# Patient Record
Sex: FEMALE
[De-identification: names, ages, dates, MRNs, and addresses within clinical notes are randomized per-mention and may not be internally consistent; named-entity substitution may affect disease eponyms.]

---

## 2018-06-24 ENCOUNTER — HOSPITAL ENCOUNTER (EMERGENCY)
Dept: HOSPITAL 14 - H.ER | Age: 73
Discharge: HOME | End: 2018-06-24
Payer: COMMERCIAL

## 2018-06-24 VITALS — RESPIRATION RATE: 18 BRPM

## 2018-06-24 VITALS
OXYGEN SATURATION: 98 % | TEMPERATURE: 98.3 F | DIASTOLIC BLOOD PRESSURE: 74 MMHG | HEART RATE: 74 BPM | SYSTOLIC BLOOD PRESSURE: 132 MMHG

## 2018-06-24 DIAGNOSIS — R42: Primary | ICD-10-CM

## 2018-06-24 DIAGNOSIS — E78.00: ICD-10-CM

## 2018-06-24 DIAGNOSIS — R10.9: ICD-10-CM

## 2018-06-24 LAB
ALBUMIN SERPL-MCNC: 4.4 G/DL (ref 3.5–5)
ALBUMIN/GLOB SERPL: 1.2 {RATIO} (ref 1–2.1)
ALT SERPL-CCNC: 34 U/L (ref 9–52)
AST SERPL-CCNC: 29 U/L (ref 14–36)
BASOPHILS # BLD AUTO: 0.1 K/UL (ref 0–0.2)
BASOPHILS NFR BLD: 1.5 % (ref 0–2)
BUN SERPL-MCNC: 14 MG/DL (ref 7–17)
CALCIUM SERPL-MCNC: 9.3 MG/DL (ref 8.4–10.2)
EOSINOPHIL # BLD AUTO: 0 K/UL (ref 0–0.7)
EOSINOPHIL NFR BLD: 0.6 % (ref 0–4)
ERYTHROCYTE [DISTWIDTH] IN BLOOD BY AUTOMATED COUNT: 13.7 % (ref 11.5–14.5)
GFR NON-AFRICAN AMERICAN: > 60
HGB BLD-MCNC: 11.2 G/DL (ref 12–16)
LYMPHOCYTES # BLD AUTO: 0.9 K/UL (ref 1–4.3)
LYMPHOCYTES NFR BLD AUTO: 21.9 % (ref 20–40)
MCH RBC QN AUTO: 29.9 PG (ref 27–31)
MCHC RBC AUTO-ENTMCNC: 33.6 G/DL (ref 33–37)
MCV RBC AUTO: 89 FL (ref 81–99)
MONOCYTES # BLD: 0.3 K/UL (ref 0–0.8)
MONOCYTES NFR BLD: 6.3 % (ref 0–10)
NEUTROPHILS # BLD: 2.8 K/UL (ref 1.8–7)
NEUTROPHILS NFR BLD AUTO: 69.7 % (ref 50–75)
NRBC BLD AUTO-RTO: 0.1 % (ref 0–0)
PLATELET # BLD: 204 K/UL (ref 130–400)
PMV BLD AUTO: 9.5 FL (ref 7.2–11.7)
RBC # BLD AUTO: 3.75 MIL/UL (ref 3.8–5.2)
WBC # BLD AUTO: 4 K/UL (ref 4.8–10.8)

## 2018-06-24 PROCEDURE — 74177 CT ABD & PELVIS W/CONTRAST: CPT

## 2018-06-24 PROCEDURE — 80053 COMPREHEN METABOLIC PANEL: CPT

## 2018-06-24 PROCEDURE — 85025 COMPLETE CBC W/AUTO DIFF WBC: CPT

## 2018-06-24 PROCEDURE — 93005 ELECTROCARDIOGRAM TRACING: CPT

## 2018-06-24 PROCEDURE — 84484 ASSAY OF TROPONIN QUANT: CPT

## 2018-06-24 PROCEDURE — 99285 EMERGENCY DEPT VISIT HI MDM: CPT

## 2018-06-24 PROCEDURE — 70450 CT HEAD/BRAIN W/O DYE: CPT

## 2018-06-24 NOTE — CT
PROCEDURE:  CT HEAD WITHOUT CONTRAST.



HISTORY:

headache



COMPARISON:

Noncontrast head CT 09/06/2016. 



TECHNIQUE:

Axial computed tomography images were obtained through the head/brain 

without intravenous contrast.  



Radiation dose:



Total exam DLP = 795.02 mGy-cm.



This CT exam was performed using one or more of the following dose 

reduction techniques: Automated exposure control, adjustment of the 

mA and/or kV according to patient size, and/or use of iterative 

reconstruction technique.



FINDINGS:



HEMORRHAGE:

No intracranial hemorrhage. 



BRAIN:

Normal gray-white matter differentiation and density are appreciated 

throughout the cerebrum and cerebellum with the brainstem appearing 

unremarkable as well.  There is no mass effect.  There is no 

suspicious extra-axial fluid collection and the midline brain anatomy 

appears diffusely unremarkable.   



VENTRICLES:

Unremarkable. No hydrocephalus. 



CALVARIUM:

Unremarkable.



PARANASAL SINUSES:

Unremarkable as visualized. No significant inflammatory changes.



MASTOID AIR CELLS:

Unremarkable as visualized. No inflammatory changes.



OTHER FINDINGS:

None.



IMPRESSION:

Unremarkable noncontrast head CT.

## 2018-06-24 NOTE — ED PDOC
HPI: General Adult


Time Seen by Provider: 06/24/18 13:21


Chief Complaint (Nursing): Dizziness/Lightheaded


Chief Complaint (Provider): Light-headed


History Per: Patient


History/Exam Limitations: no limitations


Onset/Duration Of Symptoms: Days (today)


Current Symptoms Are (Timing): Better


Additional Complaint(s): 





Pt. with dizziness, light-headed that started while she was cleaning.  Then got 

abd pain left side with nausea.  Developed weakness in both legs after.  No 

chest pain, dyspnea, headaches, vision changes, back pain, numbness, tingles.  

No dysuria.   





Past Medical History


Reviewed: Nursing Documentation, Vital Signs


Vital Signs: 


 Last Vital Signs











Temp  98.6 F   06/24/18 13:15


 


Pulse  85   06/24/18 13:15


 


Resp  18   06/24/18 13:15


 


BP  165/51 H  06/24/18 13:15


 


Pulse Ox  100   06/24/18 16:51














- Medical History


PMH: Anemia, Hypercholesterolemia


Other PMH: low wbc





- Family History


Family History: States: Unknown Family Hx





- Living Arrangements


Living Arrangements: With Family





- Social History


Alcohol: None


Drugs: Denies





- Home Medications


Home Medications: 


 Ambulatory Orders











 Medication  Instructions  Recorded


 


Meclizine HCl [Antivert] 25 mg PO TID #15 tablet 03/07/16


 


Atropine/Diphenoxylate [Lonox 2 tab PO QID PRN #30 tab 04/15/16





0.025 MG-2.5 MG]  


 


Ciprofloxacin HCl [Cipro] 500 mg PO BID #20 tab 04/15/16


 


Ondansetron ODT [Zofran ODT] 1 odt PO Q6 PRN #30 odt 04/15/16


 


metroNIDAZOLE [Flagyl] 500 mg PO TID #30 tab 04/15/16


 


Famotidine [Pepcid] 20 mg PO DAILY #14 tab 02/01/17


 


Zolpidem [Ambien] 5 mg PO HS #5 tab 02/01/17














- Allergies


Allergies/Adverse Reactions: 


 Allergies











Allergy/AdvReac Type Severity Reaction Status Date / Time


 


No Known Allergies Allergy   Verified 04/15/16 14:19














Review of Systems


ROS Statement: Except As Marked, All Systems Reviewed And Found Negative


Constitutional: Positive for: Weakness


Cardiovascular: Positive for: Light Headedness


Gastrointestinal: Positive for: Nausea, Abdominal Pain.  Negative for: Vomiting


Neurological: Positive for: Weakness, Dizziness





Physical Exam





- Reviewed


Nursing Documentation Reviewed: Yes


Vital Signs Reviewed: Yes





- Physical Exam


Appears: Positive for: Non-toxic, No Acute Distress


Head Exam: Positive for: ATRAUMATIC, NORMAL INSPECTION, NORMOCEPHALIC


Skin: Positive for: Normal Color, Warm, DRY


Eye Exam: Positive for: EOMI, Normal appearance, PERRL


ENT: Positive for: Normal ENT Inspection


Neck: Positive for: Normal, Painless ROM


Cardiovascular/Chest: Positive for: Regular Rate, Rhythm


Respiratory: Positive for: CNT, Normal Breath Sounds


Gastrointestinal/Abdominal: Positive for: Soft, Tenderness (left lower)


Back: Positive for: Normal Inspection.  Negative for: L CVA Tenderness, R CVA 

Tenderness


Extremity: Positive for: Normal ROM.  Negative for: Tenderness, Pedal Edema


Neurologic/Psych: Positive for: Alert, CNs II-XII, Oriented, Motor/Sensory 

Deficits.  Negative for: Aphasia, Facial Droop





- Laboratory Results


Result Diagrams: 


 06/24/18 14:00





 06/24/18 14:00


Interpretation Of Abn Labs: no acute





- ECG


ECG: Positive for: Interpreted By Me, Viewed By Me


ECG Rhythm: Positive for: Normal QRS, Normal ST Segment, Sinus Rhythm


Interpretation Of Abn EKG: similar to old


O2 Sat by Pulse Oximetry: 100


Pulse Ox Interpretation: Normal





- CT Scan/US


  ** ct


Other Rad Studies (CT/US): Read By Radiologist


Other Rad Interpretation: no acute





- Progress


ED Course And Treament: 





1657:  Feels better.  No symptoms.  AAOx3.  Pain free.  Tolerated po.  Fu with 

pcp. 





Disposition





- Clinical Impression


Clinical Impression: 


 Dizziness, Abdominal pain








- Patient ED Disposition


Is Patient to be Admitted: No


Counseled Patient/Family Regarding: Studies Performed, Diagnosis, Need For 

Followup





- Disposition


Referrals: 


[facility] [Outside] - 06/25/18


Disposition: Routine/Home


Disposition Time: 17:00


Condition: STABLE


Additional Instructions: 


Return if not better in 3 days. 


Instructions:  Stomach Ache and Stomach Upset, Dizziness, Nonvertigo, (DC)


Forms:  [employer] (Setswana)


Print Language: Hungarian

## 2018-06-24 NOTE — CT
PROCEDURE:  CT Abdomen and Pelvis with contrast



HISTORY:

abd pain



COMPARISON:

None.



TECHNIQUE:

Following oral and intravenous contrast administration, a CT 

examination of the abdomen and pelvis performed from the domes of the 

diaphragms to the symphysis pubis with reformatted datasets provided 

not only axial but also sagittal and coronal series.



Contrast dose: Omnipaque 300, 95 cc



Radiation dose:



Total exam DLP = 795.02 mGy-cm.



This CT exam was performed using one or more of the following dose 

reduction techniques: Automated exposure control, adjustment of the 

mA and/or kV according to patient size, and/or use of iterative 

reconstruction technique.



FINDINGS:



LOWER THORAX:

Calcified granuloma identified right lower lobe base inferiorly. 



LIVER:

Mildly diminished attenuation throughout liver compatible hepatic 

steatosis. No discrete hepatic mass or intrahepatic biliary 

dilatation is encountered. 



GALLBLADDER AND BILE DUCTS:

Unremarkable. 



PANCREAS:

Unremarkable. No gross lesion or ductal dilatation.



SPLEEN:

Unremarkable. 



ADRENALS:

Unremarkable. No mass. 



KIDNEYS AND URETERS:

Unremarkable. No hydronephrosis. No solid mass. 



VASCULATURE:

Unremarkable. No aortic aneurysm. 



BOWEL:

Stomach is completely collapsed and not well evaluated. . No 

obstruction. No gross mural thickening. 



APPENDIX:

The appendix is not identified however there is no CT pattern to 

indicate appendicitis at this time. 



PERITONEUM:

Unremarkable. No free fluid. No free air. 



LYMPH NODES:

Unremarkable. No enlarged lymph nodes. 



BLADDER:

Distended but thin and smooth walled. . 



REPRODUCTIVE:

Unremarkable. 



BONES:

Minimal grade 1 spondylolisthesis L4-5 without spondylolysis.  Facet 

joint arthropathy appears to be the etiology bilaterally. 



OTHER FINDINGS:

None.



IMPRESSION:

Limited hepatic steatosis with remainder of the examination 

unremarkable.

## 2018-06-25 NOTE — CARD
--------------- APPROVED REPORT --------------





EKG Measurement

Heart [id number]

MN [id number]

[id number]

[id number]

[id number]



<Conclusion>

Normal sinus rhythm

Moderate voltage criteria for LVH, may be normal variant

Borderline ECG

## 2019-01-30 ENCOUNTER — HOSPITAL ENCOUNTER (OUTPATIENT)
Dept: HOSPITAL 14 - H.ER | Age: 74
Setting detail: OBSERVATION
LOS: 1 days | Discharge: HOME | End: 2019-01-31
Attending: INTERNAL MEDICINE | Admitting: INTERNAL MEDICINE
Payer: MEDICAID

## 2019-01-30 DIAGNOSIS — E78.5: ICD-10-CM
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
## 2019-01-30 DIAGNOSIS — D64.9: ICD-10-CM

## 2019-01-30 DIAGNOSIS — R73.01: ICD-10-CM

## 2019-01-30 DIAGNOSIS — R07.89: Primary | ICD-10-CM

## 2019-01-30 DIAGNOSIS — I10: ICD-10-CM

## 2019-01-30 DIAGNOSIS — R53.1: ICD-10-CM

## 2019-01-30 DIAGNOSIS — D72.819: ICD-10-CM

## 2019-01-30 DIAGNOSIS — E78.00: ICD-10-CM

## 2019-01-30 LAB
ALBUMIN SERPL-MCNC: 4.2 G/DL (ref 3.5–5)
ALBUMIN/GLOB SERPL: 1.2 {RATIO} (ref 1–2.1)
ALT SERPL-CCNC: 26 U/L (ref 9–52)
AST SERPL-CCNC: 29 U/L (ref 14–36)
BASOPHILS # BLD AUTO: 0.1 K/UL (ref 0–0.2)
BASOPHILS NFR BLD: 1.7 % (ref 0–2)
BUN SERPL-MCNC: 17 MG/DL (ref 7–17)
CALCIUM SERPL-MCNC: 9.1 MG/DL (ref 8.4–10.2)
EOSINOPHIL # BLD AUTO: 0.1 K/UL (ref 0–0.7)
EOSINOPHIL NFR BLD: 1.6 % (ref 0–4)
ERYTHROCYTE [DISTWIDTH] IN BLOOD BY AUTOMATED COUNT: 15.7 % (ref 11.5–14.5)
GFR NON-AFRICAN AMERICAN: > 60
HGB BLD-MCNC: 9.9 G/DL (ref 12–16)
LYMPHOCYTES # BLD AUTO: 1 K/UL (ref 1–4.3)
LYMPHOCYTES NFR BLD AUTO: 28 % (ref 20–40)
MCH RBC QN AUTO: 28.1 PG (ref 27–31)
MCHC RBC AUTO-ENTMCNC: 32.9 G/DL (ref 33–37)
MCV RBC AUTO: 85.4 FL (ref 81–99)
MONOCYTES # BLD: 0.3 K/UL (ref 0–0.8)
MONOCYTES NFR BLD: 7.9 % (ref 0–10)
NEUTROPHILS # BLD: 2.1 K/UL (ref 1.8–7)
NEUTROPHILS NFR BLD AUTO: 60.8 % (ref 50–75)
NRBC BLD AUTO-RTO: 0 % (ref 0–0)
PLATELET # BLD: 193 K/UL (ref 130–400)
PMV BLD AUTO: 9.3 FL (ref 7.2–11.7)
RBC # BLD AUTO: 3.53 MIL/UL (ref 3.8–5.2)
WBC # BLD AUTO: 3.5 K/UL (ref 4.8–10.8)

## 2019-01-30 PROCEDURE — 80048 BASIC METABOLIC PNL TOTAL CA: CPT

## 2019-01-30 PROCEDURE — 99285 EMERGENCY DEPT VISIT HI MDM: CPT

## 2019-01-30 PROCEDURE — 85027 COMPLETE CBC AUTOMATED: CPT

## 2019-01-30 PROCEDURE — 70450 CT HEAD/BRAIN W/O DYE: CPT

## 2019-01-30 PROCEDURE — 84484 ASSAY OF TROPONIN QUANT: CPT

## 2019-01-30 PROCEDURE — 36415 COLL VENOUS BLD VENIPUNCTURE: CPT

## 2019-01-30 PROCEDURE — 71046 X-RAY EXAM CHEST 2 VIEWS: CPT

## 2019-01-30 PROCEDURE — 80053 COMPREHEN METABOLIC PANEL: CPT

## 2019-01-30 PROCEDURE — 85025 COMPLETE CBC W/AUTO DIFF WBC: CPT

## 2019-01-30 PROCEDURE — 93005 ELECTROCARDIOGRAM TRACING: CPT

## 2019-01-30 NOTE — CT
Date of service: 



01/30/2019



PROCEDURE:  CT HEAD WITHOUT CONTRAST.



HISTORY:

Rule out hemorrhage. 



COMPARISON:

Comparison made with prior CT scan of the brain dated 06/24/2018.



TECHNIQUE:

Axial computed tomography images were obtained through the head/brain 

without intravenous contrast.  



Radiation dose:



Total exam DLP = 775.36 mGy-cm.



This CT exam was performed using one or more of the following dose 

reduction techniques: Automated exposure control, adjustment of the 

mA and/or kV according to patient size, and/or use of iterative 

reconstruction technique.



FINDINGS:



HEMORRHAGE:

No intracranial hemorrhage. 



BRAIN:

No evidence of large acute infarct. 



Mild generalized volume loss 



VENTRICLES:

Unremarkable. No hydrocephalus. 



CALVARIUM:

Unremarkable.



PARANASAL SINUSES:

Frontal sinuses are underpneumatized/hypoplastic.  Minimal mucosal 

thickening seen in the few ethmoid air cells and sphenoid sinus. 



MASTOID AIR CELLS:

Redemonstrated are sclerotic underpneumatized mastoid air complexes. 



OTHER FINDINGS:

None.



IMPRESSION:

No acute intracranial hemorrhage. 



Mild generalized volume loss.

## 2019-01-30 NOTE — CP.PCM.HP
<James Ye - Last Filed: 01/30/19 22:03>





History of Present Illness





- History of Present Illness


History of Present Illness: 





74 y/o F presented to ED as per PCP request after an episode of chest pain and 

palpitations. Pt explains that around 8:30 am she developed squeezing 

sub-sternal chest pain and palpitations that last for 8-10 minutes, pain was 

8/10 intensity, non-radiating and associated with generalized weakness. Pt sat 

down on the floor in precaution for falling or passing out. 


--Pt explains that this episode was exacerbated [facility] lifted and dropped several heavy bags of clothes at home. 


--Pt visited PCP who send her to ED and ordered echocardiogram and US of b/l 

carotids as outpatient. 


--No ill contacts. No more chest pain after episode. No cold like symptoms such 

as nasal congestion, runny nose, headache or cough. No similar episodes in the 

past. Pt denies LOC, seizure-like activity, bitten tongue, urinating on herself,

SOB, cough, nausea, vomiting, epigastric abdominal pain, acid reflux, rash or 

peripheral edema. 





PCP: Dr Shaji HAQ


Meds: none 





-PMHx: denied by patient. (As per PCP papers, HTN, HLD and impaired fasting 

glucose) 


-PSHx: L foot surgery during childhood. 


-FHx: NC


-SHx: never smoker, no alcohol and no rec drugs. 








At ED:


--Vital signs WNL at ED


--CBC showed anemia and leukopenia. BMP was unremarkable. 


--Negative troponin x1.


--Head CT: no acute intracranial hemorrhage. 


--EKG: NSR with no ST-T segments changes appreciated, reviewed by me. 


--Aspirin 162mg PO and IV NSS at 100mL/hr. 








Present on Admission





- Present on Admission


Any Indicators Present on Admission: No





Review of Systems





- Review of Systems


All systems: reviewed and no additional remarkable complaints except (as per 

HPI)





- Constitutional


Constitutional: Weakness.  absent: Chills, Fever, Weight Loss





- EENT


Nose/Mouth/Throat: absent: Nasal Congestion, Tongue Swelling, Neck Pain, Neck 

Mass





- Cardiovascular


Cardiovascular: Chest Pain, Palpitations.  absent: Acrocyanosis, Claudication, 

Dyspnea, Leg Edema





- Respiratory


Respiratory: Dyspnea.  absent: Cough, Hemoptysis, Wheezing





- Gastrointestinal


Gastrointestinal: absent: Constipation, Diarrhea, Vomiting





- Genitourinary


Genitourinary: absent: Dysuria, Flank Pain





Past Patient History





- Infectious Disease


Hx of Infectious Diseases: None





- Past Social History


Smoking Status: Never Smoked





- CARDIAC


Hx Hypercholesterolemia: Yes





- HEMATOLOGICAL/ONCOLOGICAL


Hx Anemia: Yes





- PSYCHIATRIC


Hx Substance Use: No





- SURGICAL HISTORY


Hx Surgeries: Yes


Hx Hysterectomy: Yes


Hx Orthopedic Surgery: Yes (foot)





- ANESTHESIA


Hx Anesthesia: Yes


Hx Anesthesia Reactions: No





Meds


Allergies/Adverse Reactions: 


                                    Allergies











Allergy/AdvReac Type Severity Reaction Status Date / Time


 


No Known Allergies Allergy   Verified 01/30/19 17:27














Physical Exam





- Constitutional


Appears: Well, No Acute Distress





- Head Exam


Head Exam: ATRAUMATIC, NORMAL INSPECTION





- Eye Exam


Eye Exam: EOMI, Normal appearance





- Neck Exam


Neck exam: Positive for: Full Rom, Normal Inspection.  Negative for: Meningismus





- Respiratory Exam


Respiratory Exam: Clear to Auscultation Bilateral, NORMAL BREATHING PATTERN.  

absent: Rhonchi, Wheezes, Respiratory Distress





- Cardiovascular Exam


Cardiovascular Exam: REGULAR RHYTHM, +S1, +S2


Additional comments: 


No pain reproducible on palpation of chest.





- GI/Abdominal Exam


GI & Abdominal Exam: Soft.  absent: Distended, Rebound, Rigid, Tenderness





- Extremities Exam


Extremities exam: Positive for: full ROM, normal inspection.  Negative for: calf

tenderness, joint swelling, pedal edema, tenderness





- Neurological Exam


Neurological exam: Alert, Oriented x3





Results





- Vital Signs


Recent Vital Signs: 





                                Last Vital Signs











Temp  98 F   01/30/19 20:57


 


Pulse  72   01/30/19 20:57


 


Resp  16   01/30/19 20:57


 


BP  158/72 H  01/30/19 20:57


 


Pulse Ox  100   01/30/19 20:57














- Labs


Result Diagrams: 


                                 01/30/19 19:03





                                 01/30/19 19:03


Labs: 





                         Laboratory Results - last 24 hr











  01/30/19 01/30/19





  19:03 19:03


 


WBC  3.5 L 


 


RBC  3.53 L 


 


Hgb  9.9 L 


 


Hct  30.2 L 


 


MCV  85.4  D 


 


MCH  28.1 


 


MCHC  32.9 L 


 


RDW  15.7 H 


 


Plt Count  193 


 


MPV  9.3 


 


Neut % (Auto)  60.8 


 


Lymph % (Auto)  28.0 


 


Mono % (Auto)  7.9 


 


Eos % (Auto)  1.6 


 


Baso % (Auto)  1.7 


 


Neut # (Auto)  2.1 


 


Lymph # (Auto)  1.0 


 


Mono # (Auto)  0.3 


 


Eos # (Auto)  0.1 


 


Baso # (Auto)  0.1 


 


Sodium   139


 


Potassium   4.4


 


Chloride   99


 


Carbon Dioxide   27


 


Anion Gap   17


 


BUN   17


 


Creatinine   0.6 L


 


Est GFR ( Amer)   > 60


 


Est GFR (Non-Af Amer)   > 60


 


Random Glucose   109 H


 


Calcium   9.1


 


Total Bilirubin   0.3


 


AST   29


 


ALT   26


 


Alkaline Phosphatase   72


 


Troponin I   < 0.0120


 


Total Protein   7.6


 


Albumin   4.2


 


Globulin   3.4


 


Albumin/Globulin Ratio   1.2














Assessment & Plan





- Assessment and Plan (Free Text)


Assessment: 





74 y/o F presented to ED as per PCP request, admitted for evaluation of an 

episode of chest pain and palpitations, need to rule out ACS. 





PLAN:





>Chest Pain


--Afebrile, stable vital signs. 


--Likely exertional chest pain. Will rule out ACS.


--EKG: NSR with no ST-T segments changes appreciated, reviewed by me.


--CXR: unremarkable, preliminary reviewed by me. 


--Negative troponin x1


--Admit to Telemetry


--Cardiac continuous monitoring.


--F/U symptoms and vital signs.


--Repeat troponin Q6H x2 ordered


--Heart healthy diet. 





>Normocytic Anemia/Leukopenia


--Chronic issues as per patient


--PMD sent outpatient labs: CBC, CMP, U/A, lipid panel, HbA1c, vitamin D, US b/l

carotids, and echocardiography. 


--F/U as outpatient. 





>DVT Prophylaxis


--SCD's


--Lovenox 40mg SC daily. 








Case discussed with Dr Suzie Chan PGY-2.








- Date & Time


Date: 01/30/19


Time: 19:00





<Suzie Cai - Last Filed: 02/01/19 06:41>





Results





- Vital Signs


Recent Vital Signs: 





                                Last Vital Signs











Temp  97.8 F   01/31/19 08:37


 


Pulse  58 L  01/31/19 09:00


 


Resp  20   01/31/19 08:37


 


BP  127/67   01/31/19 08:37


 


Pulse Ox  99   01/31/19 08:37














- Labs


Result Diagrams: 


                                 01/31/19 05:20





                                 01/31/19 05:20


Labs: 





                         Laboratory Results - last 24 hr











  01/31/19 01/31/19





  05:20 07:30


 


WBC  2.9 L 


 


RBC  3.49 L 


 


Hgb  9.9 L 


 


Hct  30.1 L 


 


MCV  86.4 


 


MCH  28.3 


 


MCHC  32.7 L 


 


RDW  15.6 H 


 


Plt Count  185 


 


Troponin I   < 0.0120














Attending/Attestation





- Attestation


I have personally seen and examined this patient.: Yes


I have fully participated in the care of the patient.: Yes


I have reviewed all pertinent clinical information: Yes


Notes (Text): 





02/01/19 06:41


agree with findings and plan as above.

## 2019-01-30 NOTE — ED PDOC
HPI: Chest Pain


Time Seen by Provider: 01/30/19 17:35


Chief Complaint (Nursing): Chest Pain


Chief Complaint (Provider): palpitations and chest pain


History Per: Patient


History/Exam Limitations: no limitations


Onset/Duration Of Symptoms: Hrs (this morning)


Current Symptoms Are (Timing): Still Present


Additional Complaint(s): 





Cristina Vines is a 73 year old female, with no significant past medical

history, who presents to the emergency department complaining of palpitations 

associated with chest pain onset earlier today. Patient also reports a 

generalized weakness to the point that she had to sit down on the ground to 

avoid falling. Patient further states she felt mild short of breath while 

palpitations were occurring. She was seen by PMD who referred her here. She 

denies any fever, chills, dizziness or other medical complaints.





PMD: Dr. Girard





Past Medical History


Reviewed: Historical Data, Nursing Documentation, Vital Signs


Vital Signs: 





                                Last Vital Signs











Temp  98.5 F   01/30/19 17:29


 


Pulse  87   01/30/19 17:29


 


Resp  16   01/30/19 17:29


 


BP  183/67 H  01/30/19 17:29


 


Pulse Ox  99   01/30/19 17:29














- Medical History


PMH: Anemia, Hypercholesterolemia





- Surgical History


Surgical History: No Surg Hx





- Family History


Family History: States: Unknown Family Hx





- Home Medications


Home Medications: 


                                Ambulatory Orders











 Medication  Instructions  Recorded


 


Meclizine HCl [Antivert] 25 mg PO TID #15 tablet 03/07/16


 


Atropine/Diphenoxylate [Lonox 2 tab PO QID PRN #30 tab 04/15/16





0.025 MG-2.5 MG]  


 


Ciprofloxacin HCl [Cipro] 500 mg PO BID #20 tab 04/15/16


 


Ondansetron ODT [Zofran ODT] 1 odt PO Q6 PRN #30 odt 04/15/16


 


metroNIDAZOLE [Flagyl] 500 mg PO TID #30 tab 04/15/16


 


Famotidine [Pepcid] 20 mg PO DAILY #14 tab 02/01/17


 


Zolpidem [Ambien] 5 mg PO HS #5 tab 02/01/17














- Allergies


Allergies/Adverse Reactions: 


                                    Allergies











Allergy/AdvReac Type Severity Reaction Status Date / Time


 


No Known Allergies Allergy   Verified 01/30/19 17:27














Review of Systems


ROS Statement: Except As Marked, All Systems Reviewed And Found Negative


Constitutional: Positive for: Weakness (generalized).  Negative for: Fever, 

Chills


Cardiovascular: Positive for: Chest Pain, Palpitations


Respiratory: Positive for: Shortness of Breath (mild)


Neurological: Negative for: Dizziness





Physical Exam





- Reviewed


Nursing Documentation Reviewed: Yes


Vital Signs Reviewed: Yes





- Physical Exam


Appears: Positive for: No Acute Distress


Head Exam: Positive for: ATRAUMATIC, NORMAL INSPECTION, NORMOCEPHALIC


Skin: Positive for: Normal Color, Warm, Dry


Eye Exam: Positive for: Normal appearance, EOMI, PERRL


ENT: Positive for: Normal ENT Inspection


Neck: Positive for: Normal, Painless ROM, Supple


Cardiovascular/Chest: Positive for: Regular Rate, Rhythm.  Negative for: Murmur


Respiratory: Positive for: Normal Breath Sounds.  Negative for: Respiratory 

Distress


Gastrointestinal/Abdominal: Positive for: Normal Exam, Soft.  Negative for: 

Tenderness, Guarding, Rebound


Back: Positive for: Normal Inspection.  Negative for: L CVA Tenderness, R CVA 

Tenderness, Vertebral Tenderness


Extremity: Positive for: Normal ROM (upper and lower extremities).  Negative 

for: Calf Tenderness, Deformity, Swelling


Neurologic/Psych: Positive for: Alert, CNs II-XII (intact), Oriented (x3), 

Cerebellar Tests (normal).  Negative for: Motor/Sensory Deficits, Aphasia, 

Facial Droop





- ECG


O2 Sat by Pulse Oximetry: 99 (RA)


Pulse Ox Interpretation: Normal





Medical Decision Making


Medical Decision Making: 





Time: 17:35


Initial Impression: Palpitations an chest pain, r/o ACS. Will obtain Troponins 

and follow up.





Initial Plan:





--Head w/o contrast [CT]


--EKG


--CMP


--Troponin I


--CBC w/ differential


--Chest two views (PA/LAT) [RAD]


--NaCl 1,000 ml  mls/hr


--Reevaluation











----------------------------------------------------

---------------------------------------------   


Scribe Attestation:


Documented by Sebas Marrufo, acting as a scribe for James Titus MD





Provider Scribe Attestation:


All medical record entries made by the Scribe were at my direction and 

personally dictated by me. I have reviewed the chart and agree that the record 

accurately reflects my personal performance of the history, physical exam, 

medical decision making, and the department course for this patient. I have also

 personally directed, reviewed, and agree with the discharge instructions and 

disposition.





Disposition





- Clinical Impression


Clinical Impression: 


 Chest pain








- Patient ED Disposition


Is Patient to be Admitted: Transfer of Care





- Disposition


Disposition: Transfer of Care


Disposition Time: 19:02


Condition: FAIR


Forms:  ASSURED INFORMATION SECURITY (English)


Patient Signed Over To: Pietro Carter

## 2019-01-30 NOTE — ED PDOC
- Laboratory Results


Result Diagrams: 


                                 19 19:03





                                 19 19:03


Lab Results: 





                                        











Troponin I  < 0.0120 ng/mL (0.00-0.120)   19  19:    








                                        











Total Bilirubin  0.3 mg/dl (0.2-1.3)   19  19:03    


 


AST  29 U/L (14-36)   19  19:    


 


ALT  26 U/L (9-52)   19:    


 


Alkaline Phosphatase  72 U/L ()   19  19:    


 


Total Protein  7.6 G/DL (6.3-8.2)   19:    


 


Albumin  4.2 g/dL (3.5-5.0)   19:    


 


Globulin  3.4 gm/dL (2.2-3.9)   19:    


 


Albumin/Globulin Ratio  1.2  (1.0-2.1)   19:    














- ECG


O2 Sat by Pulse Oximetry: 99 (RA)


Pulse Ox Interpretation: Normal





Medical Decision Making


Medical Decision Makin:00


Patient endorsed to provider by Dr. Titus pending reevaluation.





20:00


Patient re-evaluated at bedside, states she's feeling okay at this time


Explained results to patient and need for continuous cardiac monitoring and 

serial troponins


Dr. Cai, hospitalist, aware of patient.


--------

--------------------------------------------------------------------------------


---------   


Scribe Attestation:


Documented by Sebas Marrufo, acting as a scribe for Pietro Carter MD





Provider Scribe Attestation:


All medical record entries made by the Scribe were at my direction and 

personally dictated by me. I have reviewed the chart and agree that the record 

accurately reflects my personal performance of the history, physical exam, 

medical decision making, and the department course for this patient. I have also

personally directed, reviewed, and agree with the discharge instructions and 

disposition.





Disposition





- Clinical Impression


Clinical Impression: 


 Chest pain








- POA


Present On Arrival: None





- Disposition


Disposition: Hospitalized as Observation Patient


Disposition Time: 02:00


Condition: FAIR

## 2019-01-31 VITALS
RESPIRATION RATE: 20 BRPM | OXYGEN SATURATION: 99 % | SYSTOLIC BLOOD PRESSURE: 127 MMHG | DIASTOLIC BLOOD PRESSURE: 67 MMHG | TEMPERATURE: 97.8 F

## 2019-01-31 VITALS — HEART RATE: 58 BPM

## 2019-01-31 LAB
BUN SERPL-MCNC: 15 MG/DL (ref 7–17)
CALCIUM SERPL-MCNC: 8.8 MG/DL (ref 8.4–10.2)
ERYTHROCYTE [DISTWIDTH] IN BLOOD BY AUTOMATED COUNT: 15.6 % (ref 11.5–14.5)
GFR NON-AFRICAN AMERICAN: > 60
HGB BLD-MCNC: 9.9 G/DL (ref 12–16)
MCH RBC QN AUTO: 28.3 PG (ref 27–31)
MCHC RBC AUTO-ENTMCNC: 32.7 G/DL (ref 33–37)
MCV RBC AUTO: 86.4 FL (ref 81–99)
PLATELET # BLD: 185 K/UL (ref 130–400)
RBC # BLD AUTO: 3.49 MIL/UL (ref 3.8–5.2)
WBC # BLD AUTO: 2.9 K/UL (ref 4.8–10.8)

## 2019-01-31 NOTE — RAD
Date of service: 



01/30/2019



HISTORY:

 Palpitations 



COMPARISON:

No prior.



TECHNIQUE:

Chest PA and lateral



FINDINGS:



LUNGS:

Poor inspiration with low lung volumes, crowded bronchovascular 

markings and mild bibasilar atelectasis however the possibility of 

developing lower lobe infiltrates could be excluded followup 

radiographs. 



PLEURA:

No significant pleural effusion identified. No pneumothorax apparent.



CARDIOVASCULAR:

No aortic atherosclerotic calcification present.



Normal cardiac size. No pulmonary vascular congestion. 



OSSEOUS STRUCTURES:

No significant abnormalities.



VISUALIZED UPPER ABDOMEN:

Normal.



OTHER FINDINGS:

None.



IMPRESSION:

Poor inspiration with low lung volumes, crowded bronchovascular 

markings and mild bibasilar atelectasis however the possibility of 

developing lower lobe infiltrates could be excluded followup 

radiographs.

## 2019-01-31 NOTE — CP.[email or web address].DIS
Provider





- Provider


Date of Admission: 


01/30/19 19:46





Attending physician: 


Suzie Cai DO





Time Spent in preparation of Discharge (in minutes): 35





Diagnosis





- Discharge Diagnosis


(1) Chest pain


Status: Resolved   





(2) Weakness


Status: Resolved   





Hospital Course





- Lab Results


Lab Results: 


                             Most Recent Lab Values











WBC  2.9 K/uL (4.8-10.8)  L  01/31/19  05:20    


 


RBC  3.49 Mil/uL (3.80-5.20)  L  01/31/19  05:20    


 


Hgb  9.9 g/dL (12.0-16.0)  L  01/31/19  05:20    


 


Hct  30.1 % (34.0-47.0)  L  01/31/19  05:20    


 


MCV  86.4 fl (81.0-99.0)   01/31/19  05:20    


 


MCH  28.3 pg (27.0-31.0)   01/31/19  05:20    


 


MCHC  32.7 g/dL (33.0-37.0)  L  01/31/19  05:20    


 


RDW  15.6 % (11.5-14.5)  H  01/31/19  05:20    


 


Plt Count  185 K/uL (130-400)   01/31/19  05:20    


 


MPV  9.3 fl (7.2-11.7)   01/30/19  19:03    


 


Neut % (Auto)  60.8 % (50.0-75.0)   01/30/19  19:03    


 


Lymph % (Auto)  28.0 % (20.0-40.0)   01/30/19  19:03    


 


Mono % (Auto)  7.9 % (0.0-10.0)   01/30/19  19:03    


 


Eos % (Auto)  1.6 % (0.0-4.0)   01/30/19  19:03    


 


Baso % (Auto)  1.7 % (0.0-2.0)   01/30/19  19:03    


 


Neut # (Auto)  2.1 K/uL (1.8-7.0)   01/30/19  19:03    


 


Lymph # (Auto)  1.0 K/uL (1.0-4.3)   01/30/19  19:03    


 


Mono # (Auto)  0.3 K/uL (0.0-0.8)   01/30/19  19:03    


 


Eos # (Auto)  0.1 K/uL (0.0-0.7)   01/30/19  19:03    


 


Baso # (Auto)  0.1 K/uL (0.0-0.2)   01/30/19  19:03    


 


Sodium  138 mmol/l (132-148)   01/31/19  05:20    


 


Potassium  4.1 MMOL/L (3.6-5.0)   01/31/19  05:20    


 


Chloride  101 mmol/L ()   01/31/19  05:20    


 


Carbon Dioxide  28 mmol/L (22-30)   01/31/19  05:20    


 


Anion Gap  13  (10-20)   01/31/19  05:20    


 


BUN  15 mg/dl (7-17)   01/31/19  05:20    


 


Creatinine  0.6 mg/dl (0.7-1.2)  L  01/31/19  05:20    


 


Est GFR ( Amer)  > 60   01/31/19  05:20    


 


Est GFR (Non-Af Amer)  > 60   01/31/19  05:20    


 


Random Glucose  95 mg/dL ()   01/31/19  05:20    


 


Calcium  8.8 mg/dL (8.4-10.2)   01/31/19  05:20    


 


Total Bilirubin  0.3 mg/dl (0.2-1.3)   01/30/19  19:03    


 


AST  29 U/L (14-36)   01/30/19  19:03    


 


ALT  26 U/L (9-52)   01/30/19  19:03    


 


Alkaline Phosphatase  72 U/L ()   01/30/19  19:03    


 


Troponin I  < 0.0120 ng/mL (0.00-0.120)   01/31/19  07:30    


 


Total Protein  7.6 G/DL (6.3-8.2)   01/30/19  19:03    


 


Albumin  4.2 g/dL (3.5-5.0)   01/30/19  19:03    


 


Globulin  3.4 gm/dL (2.2-3.9)   01/30/19  19:03    


 


Albumin/Globulin Ratio  1.2  (1.0-2.1)   01/30/19  19:03    














- Hospital Course


Hospital Course: 


72 y/o F presented to ED as per PCP request, admitted for evaluation of an 

episode of chest pain and palpitations, and admitted under observation to rule 

out ACS. Pt had EKG which did not show any ischemic changes (Moderate LVH, sinus

rythm) and serial troponins that were normal. She also had Head CT that did not 

show any acute changes. Pt was asymptomatic on her re-evaluation this morning 

and stated that she had felt chest pressure after lifting a heavy objected that 

subsided on spontaneously. She was noted to have low hemoglobin and was 

discharged on Iron and advised to follow up with her PMD, Dr. Girard. Will need 

anemia workup outpatient. 











Discharge Exam





- Head Exam


Head Exam: ATRAUMATIC, NORMAL INSPECTION





- Eye Exam


Eye Exam: Normal appearance





- ENT Exam


ENT Exam: Mucous Membranes Moist





- Respiratory Exam


Respiratory Exam: Clear to PA & Lateral





- GI/Abdominal Exam


GI & Abdominal Exam: Normal Bowel Sounds





- Extremities Exam


Extremities exam: normal inspection





- Neurological Exam


Neurological exam: Alert





- Psychiatric Exam


Psychiatric exam: Normal Affect





- Skin


Skin Exam: Normal Color





Discharge Plan





- Discharge Medications


Prescriptions: 


Docusate Sodium [Colace] 100 mg PO DAILY 30 Days #30 capsule


Ferrous Sulfate 325 mg PO BID 30 Days #60 tablet





- Follow Up Plan


Condition: FAIR


Disposition: HOME/ ROUTINE


Instructions:  Chest Pain (DC), Anemia of Chronic Disease (DC)


Additional Instructions: 


follow up with Dr.Rey Girard in 1 week

## 2019-01-31 NOTE — CARD
--------------- APPROVED REPORT --------------





Date of service: 01/30/2019



EKG Measurement

Heart [id number]

[id number]

[id number]

[id number]

[id number]



<Conclusion>

Normal sinus rhythm

Moderate voltage criteria for LVH, may be normal variant

Borderline ECG